# Patient Record
Sex: MALE | Race: OTHER
[De-identification: names, ages, dates, MRNs, and addresses within clinical notes are randomized per-mention and may not be internally consistent; named-entity substitution may affect disease eponyms.]

---

## 2022-11-11 ENCOUNTER — HOSPITAL ENCOUNTER (EMERGENCY)
Dept: HOSPITAL 54 - ER | Age: 32
Discharge: HOME | End: 2022-11-11
Payer: MEDICAID

## 2022-11-11 VITALS — DIASTOLIC BLOOD PRESSURE: 84 MMHG | SYSTOLIC BLOOD PRESSURE: 123 MMHG

## 2022-11-11 VITALS — WEIGHT: 165 LBS | BODY MASS INDEX: 22.35 KG/M2 | HEIGHT: 72 IN

## 2022-11-11 DIAGNOSIS — H61.21: Primary | ICD-10-CM

## 2022-11-11 NOTE — NUR
-------------------------------------------------------------------------------

           *** Note mackenzie in EDM - 11/11/22 at 1610 by RAE ***           

-------------------------------------------------------------------------------

BIBS C/O RIGHT EAR DISCOMFORT [id number], NO DISCHARGE. PT IS AFEBRILE UPON 
ARRIVAL. AWAITING MD ORDERS.